# Patient Record
Sex: MALE | Race: AMERICAN INDIAN OR ALASKA NATIVE
[De-identification: names, ages, dates, MRNs, and addresses within clinical notes are randomized per-mention and may not be internally consistent; named-entity substitution may affect disease eponyms.]

---

## 2019-04-08 ENCOUNTER — HOSPITAL ENCOUNTER (OUTPATIENT)
Dept: HOSPITAL 42 - ED | Age: 44
Setting detail: OBSERVATION
LOS: 2 days | Discharge: HOME | End: 2019-04-10
Attending: INTERNAL MEDICINE | Admitting: FAMILY MEDICINE
Payer: COMMERCIAL

## 2019-04-08 VITALS — BODY MASS INDEX: 26.6 KG/M2

## 2019-04-08 DIAGNOSIS — K76.9: ICD-10-CM

## 2019-04-08 DIAGNOSIS — Z87.891: ICD-10-CM

## 2019-04-08 DIAGNOSIS — Z90.49: ICD-10-CM

## 2019-04-08 DIAGNOSIS — F11.23: ICD-10-CM

## 2019-04-08 DIAGNOSIS — D72.829: ICD-10-CM

## 2019-04-08 DIAGNOSIS — K52.9: Primary | ICD-10-CM

## 2019-04-08 LAB
ALBUMIN SERPL-MCNC: 4.6 G/DL (ref 3–4.8)
ALBUMIN/GLOB SERPL: 1.2 {RATIO} (ref 1.1–1.8)
ALT SERPL-CCNC: 9 U/L (ref 7–56)
AMORPH SED URNS QL MICRO: (no result) /HPF
APPEARANCE UR: CLEAR
APTT BLD: 34.9 SECONDS (ref 26.9–38.3)
AST SERPL-CCNC: 44 U/L (ref 17–59)
BASOPHILS # BLD AUTO: 0.02 K/MM3 (ref 0–2)
BASOPHILS NFR BLD: 0.2 % (ref 0–3)
BILIRUB UR-MCNC: NEGATIVE MG/DL
BUN SERPL-MCNC: 19 MG/DL (ref 7–21)
CALCIUM SERPL-MCNC: 9.6 MG/DL (ref 8.4–10.5)
COLOR UR: YELLOW
EOSINOPHIL # BLD: 0 10*3/UL (ref 0–0.7)
EOSINOPHIL NFR BLD: 0 % (ref 1.5–5)
ERYTHROCYTE [DISTWIDTH] IN BLOOD BY AUTOMATED COUNT: 13 % (ref 11.5–14.5)
GFR NON-AFRICAN AMERICAN: > 60
GLUCOSE UR STRIP-MCNC: NEGATIVE MG/DL
HGB BLD-MCNC: 15.7 G/DL (ref 14–18)
INR PPP: 1.06
LEUKOCYTE ESTERASE UR-ACNC: NEGATIVE LEU/UL
LIPASE SERPL-CCNC: 83 U/L (ref 23–300)
LYMPHOCYTES # BLD: 1 10*3/UL (ref 1.2–3.4)
LYMPHOCYTES NFR BLD AUTO: 8 % (ref 22–35)
MCH RBC QN AUTO: 30.9 PG (ref 25–35)
MCHC RBC AUTO-ENTMCNC: 35 G/DL (ref 31–37)
MCV RBC AUTO: 88.2 FL (ref 80–105)
MONOCYTES # BLD AUTO: 0.2 10*3/UL (ref 0.1–0.6)
MONOCYTES NFR BLD: 1.9 % (ref 1–6)
PH UR STRIP: 8.5 [PH] (ref 4.7–8)
PLATELET # BLD: 260 10^3/UL (ref 120–450)
PMV BLD AUTO: 9.6 FL (ref 7–11)
PROT UR STRIP-MCNC: 30 MG/DL
PROTHROMBIN TIME: 12 SECONDS (ref 9.4–12.5)
RBC # BLD AUTO: 5.08 10^6/UL (ref 3.5–6.1)
RBC # UR STRIP: NEGATIVE /UL
SP GR UR STRIP: <= 1.005 (ref 1–1.03)
TROPONIN I SERPL-MCNC: < 0.01 NG/ML
UROBILINOGEN UR STRIP-ACNC: 1 E.U./DL
WBC # BLD AUTO: 12.9 10^3/UL (ref 4.5–11)

## 2019-04-08 PROCEDURE — 81001 URINALYSIS AUTO W/SCOPE: CPT

## 2019-04-08 PROCEDURE — 80346 BENZODIAZEPINES1-12: CPT

## 2019-04-08 PROCEDURE — 80053 COMPREHEN METABOLIC PANEL: CPT

## 2019-04-08 PROCEDURE — 83992 ASSAY FOR PHENCYCLIDINE: CPT

## 2019-04-08 PROCEDURE — 80353 DRUG SCREENING COCAINE: CPT

## 2019-04-08 PROCEDURE — 80358 DRUG SCREENING METHADONE: CPT

## 2019-04-08 PROCEDURE — 85025 COMPLETE CBC W/AUTO DIFF WBC: CPT

## 2019-04-08 PROCEDURE — 93005 ELECTROCARDIOGRAM TRACING: CPT

## 2019-04-08 PROCEDURE — 80349 CANNABINOIDS NATURAL: CPT

## 2019-04-08 PROCEDURE — 96375 TX/PRO/DX INJ NEW DRUG ADDON: CPT

## 2019-04-08 PROCEDURE — 82105 ALPHA-FETOPROTEIN SERUM: CPT

## 2019-04-08 PROCEDURE — 85610 PROTHROMBIN TIME: CPT

## 2019-04-08 PROCEDURE — 80361 OPIATES 1 OR MORE: CPT

## 2019-04-08 PROCEDURE — 96376 TX/PRO/DX INJ SAME DRUG ADON: CPT

## 2019-04-08 PROCEDURE — 83690 ASSAY OF LIPASE: CPT

## 2019-04-08 PROCEDURE — 71045 X-RAY EXAM CHEST 1 VIEW: CPT

## 2019-04-08 PROCEDURE — 80345 DRUG SCREENING BARBITURATES: CPT

## 2019-04-08 PROCEDURE — 87086 URINE CULTURE/COLONY COUNT: CPT

## 2019-04-08 PROCEDURE — 80324 DRUG SCREEN AMPHETAMINES 1/2: CPT

## 2019-04-08 PROCEDURE — 85730 THROMBOPLASTIN TIME PARTIAL: CPT

## 2019-04-08 PROCEDURE — 80074 ACUTE HEPATITIS PANEL: CPT

## 2019-04-08 PROCEDURE — 96374 THER/PROPH/DIAG INJ IV PUSH: CPT

## 2019-04-08 PROCEDURE — 84484 ASSAY OF TROPONIN QUANT: CPT

## 2019-04-08 PROCEDURE — 74177 CT ABD & PELVIS W/CONTRAST: CPT

## 2019-04-08 PROCEDURE — 36415 COLL VENOUS BLD VENIPUNCTURE: CPT

## 2019-04-08 PROCEDURE — 99284 EMERGENCY DEPT VISIT MOD MDM: CPT

## 2019-04-08 NOTE — RAD
Date of service: 



04/08/2019



HISTORY:

 abd pain 



COMPARISON:

No prior. 



TECHNIQUE:

1 view obtained.



FINDINGS:



LUNGS:

No active pulmonary disease.



PLEURA:

No significant pleural effusion identified, no pneumothorax apparent.



CARDIOVASCULAR:

No aortic atherosclerotic calcification present.



Normal cardiac size. No pulmonary vascular congestion. 



OSSEOUS STRUCTURES:

No significant abnormalities.



VISUALIZED UPPER ABDOMEN:

Normal.



OTHER FINDINGS:

None.



IMPRESSION:

No active disease.

## 2019-04-08 NOTE — CT
Date of service: 



04/08/2019



PROCEDURE:  CT Abdomen and Pelvis with contrast



HISTORY:

s/p appendectomy w/ abdominal pain



COMPARISON:

None.



TECHNIQUE:

Contrast dose: 150 mL Omnipaque 350



Radiation dose:



Total exam DLP = 464.14 mGy-cm.



This CT exam was performed using one or more of the following dose 

reduction techniques: Automated exposure control, adjustment of the 

mA and/or kV according to patient size, and/or use of iterative 

reconstruction technique.



FINDINGS:



LOWER THORAX:

Unremarkable. 



LIVER:

Normal size, contour and attenuation.  1.3 cm rounded low-attenuation 

mass in the right lobe of the liver.  No other mass identified.  No 

biliary dilatation. 



GALLBLADDER AND BILE DUCTS:

Unremarkable. 



PANCREAS:

Unremarkable. No gross lesion or ductal dilatation.



SPLEEN:

Unremarkable. 



ADRENALS:

Unremarkable. No mass. 



KIDNEYS AND URETERS:

Unremarkable. No hydronephrosis. No solid mass. 



VASCULATURE:

Unremarkable. No aortic aneurysm. No aortic atherosclerotic 

calcification or mural plaque present.



BOWEL:

Evaluation of the bowel is somewhat limited due to patient motion 

artifact particularly obscuring the lower abdomen/upper pelvis.  



APPENDIX:

Not identified.  Status post appendectomy by history.  No evidence of 

pericecal abscess. 



PERITONEUM:

Unremarkable. No free fluid. No free air. 



LYMPH NODES:

Unremarkable. No enlarged lymph nodes. 



BLADDER:

Unremarkable. 



REPRODUCTIVE:

Normal prostate 



BONES:

No acute fracture. 



OTHER FINDINGS:

None.



IMPRESSION:

Incidental 1.3 cm rounded low-attenuation mass in the right lobe of 

the liver.  The remainder of the examination is unremarkable.  Status 

post appendectomy.  Examination limited due to patient motion 

artifact.

## 2019-04-08 NOTE — ED PDOC
Arrival/HPI





- General


Chief Complaint: Abdominal Pain


Time Seen by Provider: 04/08/19 11:04


Historian: Patient





- History of Present Illness


Narrative History of Present Illness (Text): 





04/08/19 12:01


43 year old M with pmh of appendectomy (3 month ago) presents with chief 

complaint of abdominal pain w/ nausea, vomiting, diarrhea, fevers and chills for

the past 3 days. He reports being unable to tolerate PO recently due to vomiting

and diarrhea. He denies any recent travel or consumption of usual food. He 

attempted to self-medicate by taking Pepto-Bismol with no relief in symptoms. He

also reports associated intermittent chest pain for the last 2 weeks. Patient 

denies any headache, dizziness, shortness of breath, dyspnea on exertion, cough,

 back pain, neck pain, or any other complaint.





Time/Duration: < week


Symptom Onset: Sudden


Symptom Course: Unchanged


Activities at Onset: Rest


Context: Home





Past Medical History





- Provider Review


Nursing Documentation Reviewed: Yes





- Travel History


Have you recently traveled outside [place] w/in the past 3 mons?: No





- Infectious Disease


Hx of Infectious Diseases: None





- Psychiatric


Hx Substance Use: No





- Surgical History


Hx Appendectomy: Yes (3 months ago)





- Anesthesia


Hx Anesthesia: Yes


Hx Anesthesia Reactions: No


Hx Malignant Hyperthermia: No





Family/Social History





- Physician Review


Nursing Documentation Reviewed: Yes


Family/Social History: Unknown Family HX


Smoking Status: Current Some Days Smoker


Hx Alcohol Use: Yes


Frequency of alcohol use: Socially


Hx Substance Use: No





Allergies/Home Meds


Allergies/Adverse Reactions: 


Allergies





No Known Allergies Allergy (Verified 04/08/19 11:36)


   











Review of Systems





- Physician Review


All systems were reviewed & negative as marked: Yes





- Review of Systems


Constitutional: Fevers, Other (chills)


Eyes: Normal


ENT: Normal.  absent: Sore Throat, Rhinorrhea


Respiratory: Normal.  absent: SOB, Wheezing


Cardiovascular: Chest Pain.  absent: Palpitations


Gastrointestinal: Abdominal Pain, Diarrhea, Nausea, Vomiting


Genitourinary Male: Normal


Musculoskeletal: Normal


Skin: Normal


Neurological: Normal.  absent: Headache, Dizziness


Endocrine: Normal


Hemo/Lymphatic: Normal


Psychiatric: Normal





Physical Exam


Vital Signs Reviewed: Yes





Vital Signs











  Temp Pulse Resp BP Pulse Ox


 


 04/08/19 11:02  99.1 F  86  18  109/66  100











Temperature: Afebrile


Blood Pressure: Normal


Pulse: Regular


Respiratory Rate: Normal


Appearance: Positive for: Well-Appearing, Non-Toxic, Comfortable


Pain Distress: Moderate


Mental Status: Positive for: Alert and Oriented X 3





- Systems Exam


Head: Present: Atraumatic, Normocephalic


Pupils: Present: PERRL


Extroacular Muscles: Present: EOMI


Conjunctiva: Present: Normal


Mouth: Present: Moist Mucous Membranes


Neck: Present: Normal Range of Motion


Respiratory/Chest: Present: Clear to Auscultation, Good Air Exchange.  No: 

Respiratory Distress, Accessory Muscle Use


Cardiovascular: Present: Regular Rate and Rhythm, Normal S1, S2.  No: Murmurs


Abdomen: Present: Tenderness (diffuse).  No: Distention, Peritoneal Signs


Back: Present: Normal Inspection


Upper Extremity: Present: Normal Inspection.  No: Cyanosis, Edema


Lower Extremity: Present: Normal Inspection.  No: Edema


Neurological: Present: Speech Normal


Skin: Present: Warm, Dry, Normal Color.  No: Rashes


Psychiatric: Present: Alert, Oriented x 3, Normal Insight, Normal Concentration





Medical Decision Making


ED Course and Treatment: 





04/08/19 12:09


Impression:


43 year old M presents with chief complaint of abdominal pain w/ nausea, 

vomiting, diarrhea, fevers and chills x3 days .  Patient also complains of chest

pain x 2wks. 





Plan:


-- Labs


-- Chest X-ray


--CT a/p


-- Zofran


-- UA


-- Reassess and disposition





Prior Visits:


Notes and results from previous visits were reviewed.





Progress Notes:


04/08/19 16:02


Patient reevaluated and reports minimal change from initial presentation. Labs 

reviewed with slight a a CT a/p reveals no acute intraabdominal pathology. 

Shared decision making with patient to stay in the hospital for observation. 

Discussed case with Dr. Lr(hospitalist) who accepts case onto her service. 








- Lab Interpretations


Lab Results: 














                                 04/08/19 11:58 





                                 04/08/19 11:58 





                                   Lab Results





04/08/19 11:58: Sodium 141, Potassium 4.0, Chloride 104, Carbon Dioxide 25, 

Anion Gap 16, BUN 19, Creatinine 0.8, Est GFR (African Amer) > 60, Est GFR (Non-

Af Amer) > 60, Random Glucose 112 H, Calcium 9.6, Total Bilirubin 0.6, AST 44, 

ALT 9, Alkaline Phosphatase 73, Troponin I < 0.01, Total Protein 8.3, Albumin 

4.6, Globulin 3.8, Albumin/Globulin Ratio 1.2, Lipase 83


04/08/19 11:58: PT 12.0, INR 1.06, APTT 34.9


04/08/19 11:58: WBC 12.9 H, RBC 5.08, Hgb 15.7, Hct 44.8, MCV 88.2, MCH 30.9, 

MCHC 35.0, RDW 13.0, Plt Count 260, MPV 9.6, Neut % (Auto) 89.9 H, Lymph % 

(Auto) 8.0 L, Mono % (Auto) 1.9, Eos % (Auto) 0.0 L, Baso % (Auto) 0.2, Lymph # 

(Auto) 1.0 L, Mono # (Auto) 0.2, Eos # (Auto) 0.0, Baso # (Auto) 0.02, Absolute 

Neuts (auto) 11.58 H








I have reviewed the lab results: Yes





- RAD Interpretation


Narrative RAD Interpretations (Text): 





04/08/19 14:26


CT of abdomen/pelvis reviewed by radiologist, shows:


FINDINGS:


LOWER THORAX:


Unremarkable. 


LIVER:


Normal size, contour and attenuation.  1.3 cm rounded low-attenuation mass in 

the right lobe of the liver.  No other mass identified.  No biliary dilatation. 


GALLBLADDER AND BILE DUCTS:


Unremarkable. 


PANCREAS:


Unremarkable. No gross lesion or ductal dilatation.


SPLEEN:


Unremarkable. 


ADRENALS:


Unremarkable. No mass. 


KIDNEYS AND URETERS:


Unremarkable. No hydronephrosis. No solid mass. 


VASCULATURE:


Unremarkable. No aortic aneurysm. No aortic atherosclerotic calcification or 

mural plaque present.


BOWEL:


Evaluation of the bowel is somewhat limited due to patient motion artifact 

particularly obscuring the lower abdomen/upper pelvis.  


APPENDIX:


Not identified.  Status post appendectomy by history.  No evidence of pericecal 

abscess. 


PERITONEUM:


Unremarkable. No free fluid. No free air. 


LYMPH NODES:


Unremarkable. No enlarged lymph nodes. 


BLADDER:


Unremarkable. 


REPRODUCTIVE:


Normal prostate 


BONES:


No acute fracture. 


OTHER FINDINGS:


None.


IMPRESSION:


Incidental 1.3 cm rounded low-attenuation mass in the right lobe of the liver.  

The remainder of the examination is unremarkable.  Status post appendectomy.  

Examination limited due to patient motion artifact.


==============================================

================================================================








04/08/19 14:28


Chest X-ray reviewed by radiologist, shows:


FINDINGS:


LUNGS:


No active pulmonary disease.


PLEURA:


No significant pleural effusion identified, no pneumothorax apparent.


CARDIOVASCULAR:


No aortic atherosclerotic calcification present.


Normal cardiac size. No pulmonary vascular congestion. 


OSSEOUS STRUCTURES:


No significant abnormalities.


VISUALIZED UPPER ABDOMEN:


Normal.


OTHER FINDINGS:


None.


IMPRESSION:


No active disease.





Radiology Orders: 











04/08/19 11:43


CHEST PORTABLE [RAD] Stat 











: Radiologist





- Medication Orders


Current Medication Orders: 











Sodium Chloride (Sodium Chloride 0.9%)  1,000 mls @ 999 mls/hr IV .Q1H1M STA


   Stop: 04/08/19 12:43





Discontinued Medications





Ondansetron HCl (Zofran Inj)  4 mg IVP STAT STA


   Stop: 04/08/19 11:44











- Scribe Statement


The provider has reviewed the documentation as recorded by the Scribe Dalyngs Duvelsaint





All medical record entries made by the Florinibamanda were at my direction and 

personally dictated by me. I have reviewed the chart and agree that the record 

accurately reflects my personal performance of the history, physical exam, 

medical decision making, and the department course for this patient. I have also

 personally directed, reviewed, and agree with the discharge instructions and 

disposition.








Disposition/Present on Arrival





- Present on Arrival


Any Indicators Present on Arrival: No


History of DVT/PE: No


History of Uncontrolled Diabetes: No


Urinary Catheter: No


History of Decub. Ulcer: No


History Surgical Site Infection Following: None





- Disposition


Have Diagnosis and Disposition been Completed?: Yes


Diagnosis: 


 Gastroenteritis





Disposition: HOSPITALIZED


Disposition Time: 15:00


Patient Plan: Admission


Condition: GOOD

## 2019-04-08 NOTE — CP.PCM.HP
<Leoncio Anne - Last Filed: 04/08/19 20:30>





History of Present Illness





- History of Present Illness


History of Present Illness: 


Internal Medicine History and Physical (Dr. Thornton's Service)





CC: Nausea/Vomiting/Diarrhea





HPI: Mr. Larios is a 43 year old male with a past medical history significant 

for recent appendectomy presents for N/V, diarrhea and epigastric abdominal pain

for the past three days. Patient reports that for the past 10 months he has been

having episodic sharp and crampy abdominal pain that lasts 3-4 days. This is 

associated with 2-3 episodes of bilious vomiting daily as well as watery 

diarrhea 2-3 times per day. He denies any association with PO intake of any 

kind, activity, or with urination. He denies any relieving factors including 

Pepcid and Pepto Bismol. Patient has been seen multiple times at [facility] for this 

complaint and had an appendectomy approximately 1-2 months ago. He denies any 

relief after the operation and has had the same abdominal pain, N/V and diarrhea

at the same frequencies since that time. Patient denies ever having been seen by

an outpatient Gastroenterologist or ever having had an EGD/CSPY. Patient denies 

any other complaints at this time. 12 point ROS reviewed and is unremarkable 

outside of what has been mentioned above.





PMH: Denies


PSH: Appendectomy, Ankle Repair and Jaw Repair


Family History: Mother-Passed away from complications related to HIV


Social History: Current one pack per day smoker, drinks 2-3 mixed vodka drinks 

2-3/week, and intermittently uses illicit drugs (used ecstasy two weeks ago)


Allergies: NKDA


Home Medications: None





PMD: None





Present on Admission





- Present on Admission


Any Indicators Present on Admission: No





Review of Systems





- Review of Systems


Review of Systems: 


As stated in HPI, otherwise negative





Past Patient History





- Infectious Disease


Hx of Infectious Diseases: None





- Past Social History


Smoking Status: Current Some Days Smoker





- PSYCHIATRIC


Hx Substance Use: No





- SURGICAL HISTORY


Hx Appendectomy: Yes (3 months ago)





- ANESTHESIA


Hx Anesthesia: Yes


Hx Anesthesia Reactions: No


Hx Malignant Hyperthermia: No





Meds


Allergies/Adverse Reactions: 


                                    Allergies











Allergy/AdvReac Type Severity Reaction Status Date / Time


 


No Known Allergies Allergy   Verified 04/08/19 11:36














Physical Exam





- Constitutional


Appears: Non-toxic, No Acute Distress





- Head Exam


Head Exam: ATRAUMATIC, NORMOCEPHALIC





- Eye Exam


Eye Exam: EOMI, Normal appearance, PERRL





- ENT Exam


ENT Exam: Mucous Membranes Moist





- Neck Exam


Neck exam: Positive for: Full Rom





- Respiratory Exam


Respiratory Exam: Clear to Auscultation Bilateral, NORMAL BREATHING PATTERN.  

absent: Accessory Muscle Use, Rales, Rhonchi, Wheezes, Respiratory Distress





- Cardiovascular Exam


Cardiovascular Exam: REGULAR RHYTHM, RRR, +S1, +S2





- GI/Abdominal Exam


GI & Abdominal Exam: Normal Bowel Sounds, Soft, Tenderness (tenderness to deep 

palpation diffusely).  absent: Bruit, Diminished Bowel Sounds, Distended, Firm, 

Guarding, Hernia, Hyperactive Bowel Sounds, Hypoactive Bowel Sounds, Mass, 

Organomegaly, Pulsatile Mass, Rebound, Rigid





- Extremities Exam


Extremities exam: Positive for: full ROM, normal capillary refill, normal 

inspection, pedal pulses present.  Negative for: calf tenderness, joint 

swelling, pedal edema, tenderness





- Neurological Exam


Neurological exam: Alert, Oriented x3





- Psychiatric Exam


Psychiatric exam: Normal Affect, Normal Mood





- Skin


Skin Exam: Dry, Intact, Normal Color, Warm





Results





- Vital Signs


Recent Vital Signs: 





                                Last Vital Signs











Temp  98.4 F   04/08/19 15:51


 


Pulse  70   04/08/19 15:51


 


Resp  18   04/08/19 15:51


 


BP  154/57 H  04/08/19 15:51


 


Pulse Ox  100   04/08/19 15:51














- Labs


Result Diagrams: 


                                 04/08/19 11:58





                                 04/08/19 11:58


Labs: 





                         Laboratory Results - last 24 hr











  04/08/19 04/08/19 04/08/19





  11:58 11:58 11:58


 


WBC  12.9 H  


 


RBC  5.08  


 


Hgb  15.7  


 


Hct  44.8  


 


MCV  88.2  


 


MCH  30.9  


 


MCHC  35.0  


 


RDW  13.0  


 


Plt Count  260  


 


MPV  9.6  


 


Neut % (Auto)  89.9 H  


 


Lymph % (Auto)  8.0 L  


 


Mono % (Auto)  1.9  


 


Eos % (Auto)  0.0 L  


 


Baso % (Auto)  0.2  


 


Lymph # (Auto)  1.0 L  


 


Mono # (Auto)  0.2  


 


Eos # (Auto)  0.0  


 


Baso # (Auto)  0.02  


 


Absolute Neuts (auto)  11.58 H  


 


PT   12.0 


 


INR   1.06 


 


APTT   34.9 


 


Sodium    141


 


Potassium    4.0


 


Chloride    104


 


Carbon Dioxide    25


 


Anion Gap    16


 


BUN    19


 


Creatinine    0.8


 


Est GFR ( Amer)    > 60


 


Est GFR (Non-Af Amer)    > 60


 


Random Glucose    112 H


 


Calcium    9.6


 


Total Bilirubin    0.6


 


AST    44


 


ALT    9


 


Alkaline Phosphatase    73


 


Troponin I    < 0.01


 


Total Protein    8.3


 


Albumin    4.6


 


Globulin    3.8


 


Albumin/Globulin Ratio    1.2


 


Lipase    83














Assessment & Plan





- Assessment and Plan (Free Text)


Assessment: 


43 year old male with a past medical history significant for recent appendectomy

presents for N/V, diarrhea and epigastric abdominal pain for the past three 

days.


Plan: 





1. Abdominal Pain


-CT Abdomen/Pelvis showed no acute processes


-Afebrile, normotensive, non-tachycardic and with noted mild leukocytosis


-Normal Saline at 100mls/hr


-Zofran 4mg Q4 PRN for N/V


-Protonix 40mg IVP


-Vitamin A&D ointment PRN for dry mouth


-NPO except medications





2. Leukocytosis


-Mild; Noted on CBC in ED


-Chest X-Ray showed no active disease


-UA showing no signs of UTI


-Continue to monitor with daily CBC's





3. Liver Cyst


-Noted incidentally on CT abdomen/pelvis


-Will obtain records from [facility] for comparison


-Will recommend outpatient followup for serial imaging





GI Prophylaxis: Protonix


DVT Prophylaxis: SCD's





Patient seen and case discussed with attending, Dr. Thornton.


Leoncio Anne PGY2





- Date & Time


Date: 04/08/19


Time: 17:16





<Clara Thornton - Last Filed: 04/09/19 08:37>





Results





- Vital Signs


Recent Vital Signs: 





                                Last Vital Signs











Temp  98.4 F   04/09/19 08:18


 


Pulse  65   04/09/19 08:18


 


Resp  18   04/09/19 08:18


 


BP  166/86 H  04/09/19 08:18


 


Pulse Ox  93 L  04/09/19 08:18














- Labs


Result Diagrams: 


                                 04/09/19 05:30





                                 04/09/19 05:30


Labs: 





                         Laboratory Results - last 24 hr











  04/08/19 04/08/19 04/08/19





  11:58 11:58 11:58


 


WBC  12.9 H  


 


RBC  5.08  


 


Hgb  15.7  


 


Hct  44.8  


 


MCV  88.2  


 


MCH  30.9  


 


MCHC  35.0  


 


RDW  13.0  


 


Plt Count  260  


 


MPV  9.6  


 


Neut % (Auto)  89.9 H  


 


Lymph % (Auto)  8.0 L  


 


Mono % (Auto)  1.9  


 


Eos % (Auto)  0.0 L  


 


Baso % (Auto)  0.2  


 


Lymph # (Auto)  1.0 L  


 


Mono # (Auto)  0.2  


 


Eos # (Auto)  0.0  


 


Baso # (Auto)  0.02  


 


Absolute Neuts (auto)  11.58 H  


 


PT   12.0 


 


INR   1.06 


 


APTT   34.9 


 


Sodium    141


 


Potassium    4.0


 


Chloride    104


 


Carbon Dioxide    25


 


Anion Gap    16


 


BUN    19


 


Creatinine    0.8


 


Est GFR ( Amer)    > 60


 


Est GFR (Non-Af Amer)    > 60


 


Random Glucose    112 H


 


Calcium    9.6


 


Total Bilirubin    0.6


 


AST    44


 


ALT    9


 


Alkaline Phosphatase    73


 


Troponin I    < 0.01


 


Total Protein    8.3


 


Albumin    4.6


 


Globulin    3.8


 


Albumin/Globulin Ratio    1.2


 


Lipase    83


 


Urine Color   


 


Urine Appearance   


 


Urine pH   


 


Ur Specific Gravity   


 


Urine Protein   


 


Urine Glucose (UA)   


 


Urine Ketones   


 


Urine Blood   


 


Urine Nitrate   


 


Urine Bilirubin   


 


Urine Urobilinogen   


 


Ur Leukocyte Esterase   


 


Urine RBC   


 


Urine WBC   


 


Ur Epithelial Cells   


 


Amorphous Sediment   


 


Urine Opiates Screen   


 


Urine Methadone Screen   


 


Ur Barbiturates Screen   


 


Ur Phencyclidine Scrn   


 


Ur Amphetamines Screen   


 


U Benzodiazepines Scrn   


 


U Oth Cocaine Metabols   


 


U Cannabinoids Screen   














  04/08/19 04/08/19 04/09/19





  17:30 17:30 05:30


 


WBC    14.4 H


 


RBC    4.51


 


Hgb    13.8 L


 


Hct    40.3 L


 


MCV    89.4


 


MCH    30.6


 


MCHC    34.2


 


RDW    13.1


 


Plt Count    254


 


MPV    10.1


 


Neut % (Auto)    89.8 H


 


Lymph % (Auto)    7.7 L


 


Mono % (Auto)    2.5


 


Eos % (Auto)    0.0 L


 


Baso % (Auto)    0.0


 


Lymph # (Auto)    1.1 L


 


Mono # (Auto)    0.4


 


Eos # (Auto)    0.0


 


Baso # (Auto)    0.00


 


Absolute Neuts (auto)    12.91 H


 


PT   


 


INR   


 


APTT   


 


Sodium   


 


Potassium   


 


Chloride   


 


Carbon Dioxide   


 


Anion Gap   


 


BUN   


 


Creatinine   


 


Est GFR ( Amer)   


 


Est GFR (Non-Af Amer)   


 


Random Glucose   


 


Calcium   


 


Total Bilirubin   


 


AST   


 


ALT   


 


Alkaline Phosphatase   


 


Troponin I   


 


Total Protein   


 


Albumin   


 


Globulin   


 


Albumin/Globulin Ratio   


 


Lipase   


 


Urine Color   Yellow 


 


Urine Appearance   Clear 


 


Urine pH   8.5 


 


Ur Specific Gravity   <= 1.005 


 


Urine Protein   30 H 


 


Urine Glucose (UA)   Negative 


 


Urine Ketones   40 H 


 


Urine Blood   Negative 


 


Urine Nitrate   Negative 


 


Urine Bilirubin   Negative 


 


Urine Urobilinogen   1.0 H 


 


Ur Leukocyte Esterase   Negative 


 


Urine RBC   TEST NOT PERFORMED 


 


Urine WBC   5 - 10 H 


 


Ur Epithelial Cells   6 - 8 H 


 


Amorphous Sediment   Trace 


 


Urine Opiates Screen  Positive H  


 


Urine Methadone Screen  Negative  


 


Ur Barbiturates Screen  Negative  


 


Ur Phencyclidine Scrn  Negative  


 


Ur Amphetamines Screen  Negative  


 


U Benzodiazepines Scrn  Negative  


 


U Oth Cocaine Metabols  Negative  


 


U Cannabinoids Screen  Negative  














  04/09/19





  05:30


 


WBC 


 


RBC 


 


Hgb 


 


Hct 


 


MCV 


 


MCH 


 


MCHC 


 


RDW 


 


Plt Count 


 


MPV 


 


Neut % (Auto) 


 


Lymph % (Auto) 


 


Mono % (Auto) 


 


Eos % (Auto) 


 


Baso % (Auto) 


 


Lymph # (Auto) 


 


Mono # (Auto) 


 


Eos # (Auto) 


 


Baso # (Auto) 


 


Absolute Neuts (auto) 


 


PT 


 


INR 


 


APTT 


 


Sodium  144


 


Potassium  3.9


 


Chloride  109 H


 


Carbon Dioxide  24


 


Anion Gap  15


 


BUN  26 H


 


Creatinine  1.0


 


Est GFR ( Amer)  > 60


 


Est GFR (Non-Af Amer)  > 60


 


Random Glucose  95


 


Calcium  8.9


 


Total Bilirubin  0.5


 


AST  29


 


ALT  10


 


Alkaline Phosphatase  60


 


Troponin I  < 0.01


 


Total Protein  6.9


 


Albumin  3.8


 


Globulin  3.1


 


Albumin/Globulin Ratio  1.2


 


Lipase 


 


Urine Color 


 


Urine Appearance 


 


Urine pH 


 


Ur Specific Gravity 


 


Urine Protein 


 


Urine Glucose (UA) 


 


Urine Ketones 


 


Urine Blood 


 


Urine Nitrate 


 


Urine Bilirubin 


 


Urine Urobilinogen 


 


Ur Leukocyte Esterase 


 


Urine RBC 


 


Urine WBC 


 


Ur Epithelial Cells 


 


Amorphous Sediment 


 


Urine Opiates Screen 


 


Urine Methadone Screen 


 


Ur Barbiturates Screen 


 


Ur Phencyclidine Scrn 


 


Ur Amphetamines Screen 


 


U Benzodiazepines Scrn 


 


U Oth Cocaine Metabols 


 


U Cannabinoids Screen 














Attending/Attestation





- Attestation


I have personally seen and examined this patient.: Yes


I have fully participated in the care of the patient.: Yes


I have reviewed all pertinent clinical information: Yes


Notes (Text): 





04/08/19


43 year old male who presents with complaint of intractable nausea and vomiting 

with episodes of diarrhea and epigastric pain.  He reports symptoms have been on

and off ongoing for the past several months.  History of appendectomy several 

months ago.  CT abd/pelvis showed incidental 1.3 cm right lobe liver lesion, 

otherwise unremarkable.  Mild leukocytosis on labs, likely reactive.  CXR/UA is 

negative.  UTox is ordered.  Continue with NPO, IVF, protonix and anti-emetics 

as needed.  Consider GI evaluation if symptoms are persistent.  Will need 

outpatient follow up for incidental liver lesion seen on CT scan.  Can request 

prior records from [facility] for comparison if available.





Clara Thornton MD


Hospitalist.

## 2019-04-09 VITALS
OXYGEN SATURATION: 98 % | SYSTOLIC BLOOD PRESSURE: 142 MMHG | TEMPERATURE: 99.6 F | HEART RATE: 63 BPM | DIASTOLIC BLOOD PRESSURE: 82 MMHG

## 2019-04-09 VITALS — RESPIRATION RATE: 18 BRPM

## 2019-04-09 LAB
ALBUMIN SERPL-MCNC: 3.8 G/DL (ref 3–4.8)
ALBUMIN/GLOB SERPL: 1.2 {RATIO} (ref 1.1–1.8)
ALT SERPL-CCNC: 10 U/L (ref 7–56)
AST SERPL-CCNC: 29 U/L (ref 17–59)
BASOPHILS # BLD AUTO: 0 K/MM3 (ref 0–2)
BASOPHILS NFR BLD: 0 % (ref 0–3)
BUN SERPL-MCNC: 26 MG/DL (ref 7–21)
CALCIUM SERPL-MCNC: 8.9 MG/DL (ref 8.4–10.5)
EOSINOPHIL # BLD: 0 10*3/UL (ref 0–0.7)
EOSINOPHIL NFR BLD: 0 % (ref 1.5–5)
ERYTHROCYTE [DISTWIDTH] IN BLOOD BY AUTOMATED COUNT: 13.1 % (ref 11.5–14.5)
GFR NON-AFRICAN AMERICAN: > 60
HEPATITIS A IGM: NEGATIVE
HEPATITIS B CORE AB: NEGATIVE
HEPATITIS C ANTIBODY: NEGATIVE
HGB BLD-MCNC: 13.8 G/DL (ref 14–18)
LYMPHOCYTES # BLD: 1.1 10*3/UL (ref 1.2–3.4)
LYMPHOCYTES NFR BLD AUTO: 7.7 % (ref 22–35)
MCH RBC QN AUTO: 30.6 PG (ref 25–35)
MCHC RBC AUTO-ENTMCNC: 34.2 G/DL (ref 31–37)
MCV RBC AUTO: 89.4 FL (ref 80–105)
MONOCYTES # BLD AUTO: 0.4 10*3/UL (ref 0.1–0.6)
MONOCYTES NFR BLD: 2.5 % (ref 1–6)
PLATELET # BLD: 254 10^3/UL (ref 120–450)
PMV BLD AUTO: 10.1 FL (ref 7–11)
RBC # BLD AUTO: 4.51 10^6/UL (ref 3.5–6.1)
TROPONIN I SERPL-MCNC: < 0.01 NG/ML
WBC # BLD AUTO: 14.4 10^3/UL (ref 4.5–11)

## 2019-04-09 RX ADMIN — POLYETHYLENE GLYCOL 3350 SCH: 17 POWDER, FOR SOLUTION ORAL at 18:01

## 2019-04-09 NOTE — CP.PCM.CON
<Helen Vargas - Last Filed: 04/09/19 13:34>





History of Present Illness





- History of Present Illness


History of Present Illness: 


Gastroenterology Fellow/PGY6 Consult Note





43 year old male with PMH of Polysubstance abuse (heroin, ecstasy, tobacco, 

alcohol) presenting with vomiting and diarrhea. Patient notes onset of symptoms 

for the last few days with over ten episodes of watery diarrhea and bilious 

vomiting leading to presentation. Patient notes improving of vomitning after 

medical treatment received in ER. Admits to persistent nausea and watery 

diarrhea. Associated chills and dry cough. Denies abdominal pain, heartburn, 

acid reflux, hematemesis, melena, hematochezia, or unintentional weight loss, 

recent sick contacts, or new medications. Last used heroin and ecstasy five days

ago. Notes he usually uses illicit drugs every two days at baseline. Notes that 

he is a  and delivers packages to medical offices. [place] 

[place] [facility] prior EGD or colonoscopy. 





Family History- Mother- HIV, uncle-MI; denies liver disease, stomach cancer, 

colon cancer


Social History- 1/2-1ppd, at least 2 pints of beer/liquor daily 3-4 times a 

week, ecstasy and heroin use every two days


Surgical History- appendectomy, jaw repair, ankle repair








Review of Systems





- Review of Systems


Review of Systems: 


12-point review of systems negative except for as above








Past Patient History





- Infectious Disease


Hx of Infectious Diseases: None





- Past Social History


Smoking Status: Current Some Days Smoker





- CARDIAC


Hx Cardiac Disorders: No





- PULMONARY


Hx Respiratory Disorders: No





- NEUROLOGICAL


Hx Neurological Disorder: No





- HEENT


Hx HEENT Problems: No





- RENAL


Hx Chronic Kidney Disease: No





- ENDOCRINE/METABOLIC


Hx Endocrine Disorders: No





- HEMATOLOGICAL/ONCOLOGICAL


Hx Blood Disorders: No





- INTEGUMENTARY


Hx Dermatological Problems: No





- MUSCULOSKELETAL/RHEUMATOLOGICAL


Hx Musculoskeletal Disorders: No


Hx Falls: No





- GASTROINTESTINAL


Hx Gastrointestinal Disorders: No





- GENITOURINARY/GYNECOLOGICAL


Hx Genitourinary Disorders: No





- PSYCHIATRIC


Hx Substance Use: No





- SURGICAL HISTORY


Hx Appendectomy: Yes (3 months ago)





- ANESTHESIA


Hx Anesthesia: Yes


Hx Anesthesia Reactions: No


Hx Malignant Hyperthermia: No





Meds


Allergies/Adverse Reactions: 


                                    Allergies











Allergy/AdvReac Type Severity Reaction Status Date / Time


 


No Known Allergies Allergy   Verified 04/08/19 11:36














- Medications


Medications: 


                               Current Medications





Sodium Chloride (Sodium Chloride 0.9%)  1,000 mls @ 100 mls/hr IV .Q10H KATIE


   Last Admin: 04/09/19 05:17 Dose:  100 mls/hr


Ondansetron HCl (Zofran Inj)  4 mg IVP Q4H PRN


   PRN Reason: Nausea/Vomiting


   Last Admin: 04/09/19 01:20 Dose:  4 mg


Pantoprazole Sodium (Protonix Inj)  40 mg IVP DAILY [facility]


Vitamin A (Vitamin A & D Oint Ud Foilpak)  1 ea TOP Q2 PRN


   PRN Reason: Dry mouth











Physical Exam





- Constitutional


Appears: Non-toxic, No Acute Distress





- Head Exam


Head Exam: ATRAUMATIC, NORMOCEPHALIC





- Eye Exam


Eye Exam: EOMI, PERRL.  absent: Scleral icterus


Pupil Exam: PERRL.  absent: Miosis, Mydriatic





- ENT Exam


ENT Exam: Mucous Membranes Moist, Normal Oropharynx





- Neck Exam


Neck exam: Positive for: Full Rom, Normal Inspection





- Respiratory Exam


Respiratory Exam: Clear to Auscultation Bilateral.  absent: Rales, Rhonchi, 

Wheezes





- Cardiovascular Exam


Cardiovascular Exam: RRR, +S1, +S2.  absent: Gallop, Rubs





- GI/Abdominal Exam


GI & Abdominal Exam: Normal Bowel Sounds, Soft.  absent: Distended, Firm, 

Guarding, Organomegaly, Rebound, Rigid, Tenderness





- Extremities Exam


Extremities exam: Positive for: normal inspection.  Negative for: pedal edema





- Neurological Exam


Neurological exam: Alert





- Psychiatric Exam


Psychiatric exam: Normal Affect, Normal Mood





- Skin


Skin Exam: Dry, Intact, Normal Color, Warm





Results





- Vital Signs


Recent Vital Signs: 


                                Last Vital Signs











Temp  98.4 F   04/09/19 08:18


 


Pulse  65   04/09/19 08:18


 


Resp  18   04/09/19 08:18


 


BP  166/86 H  04/09/19 08:18


 


Pulse Ox  93 L  04/09/19 08:18














- Labs


Result Diagrams: 


                                 04/09/19 05:30





                                 04/09/19 05:30


Labs: 


                         Laboratory Results - last 24 hr











  04/08/19 04/08/19 04/08/19





  11:58 11:58 11:58


 


WBC  12.9 H  


 


RBC  5.08  


 


Hgb  15.7  


 


Hct  44.8  


 


MCV  88.2  


 


MCH  30.9  


 


MCHC  35.0  


 


RDW  13.0  


 


Plt Count  260  


 


MPV  9.6  


 


Neut % (Auto)  89.9 H  


 


Lymph % (Auto)  8.0 L  


 


Mono % (Auto)  1.9  


 


Eos % (Auto)  0.0 L  


 


Baso % (Auto)  0.2  


 


Lymph # (Auto)  1.0 L  


 


Mono # (Auto)  0.2  


 


Eos # (Auto)  0.0  


 


Baso # (Auto)  0.02  


 


Absolute Neuts (auto)  11.58 H  


 


PT   12.0 


 


INR   1.06 


 


APTT   34.9 


 


Sodium    141


 


Potassium    4.0


 


Chloride    104


 


Carbon Dioxide    25


 


Anion Gap    16


 


BUN    19


 


Creatinine    0.8


 


Est GFR ( Amer)    > 60


 


Est GFR (Non-Af Amer)    > 60


 


Random Glucose    112 H


 


Calcium    9.6


 


Total Bilirubin    0.6


 


AST    44


 


ALT    9


 


Alkaline Phosphatase    73


 


Troponin I    < 0.01


 


Total Protein    8.3


 


Albumin    4.6


 


Globulin    3.8


 


Albumin/Globulin Ratio    1.2


 


Lipase    83


 


Urine Color   


 


Urine Appearance   


 


Urine pH   


 


Ur Specific Gravity   


 


Urine Protein   


 


Urine Glucose (UA)   


 


Urine Ketones   


 


Urine Blood   


 


Urine Nitrate   


 


Urine Bilirubin   


 


Urine Urobilinogen   


 


Ur Leukocyte Esterase   


 


Urine RBC   


 


Urine WBC   


 


Ur Epithelial Cells   


 


Amorphous Sediment   


 


Urine Opiates Screen   


 


Urine Methadone Screen   


 


Ur Barbiturates Screen   


 


Ur Phencyclidine Scrn   


 


Ur Amphetamines Screen   


 


U Benzodiazepines Scrn   


 


U Oth Cocaine Metabols   


 


U Cannabinoids Screen   














  04/08/19 04/08/19 04/09/19





  17:30 17:30 05:30


 


WBC    14.4 H


 


RBC    4.51


 


Hgb    13.8 L


 


Hct    40.3 L


 


MCV    89.4


 


MCH    30.6


 


MCHC    34.2


 


RDW    13.1


 


Plt Count    254


 


MPV    10.1


 


Neut % (Auto)    89.8 H


 


Lymph % (Auto)    7.7 L


 


Mono % (Auto)    2.5


 


Eos % (Auto)    0.0 L


 


Baso % (Auto)    0.0


 


Lymph # (Auto)    1.1 L


 


Mono # (Auto)    0.4


 


Eos # (Auto)    0.0


 


Baso # (Auto)    0.00


 


Absolute Neuts (auto)    12.91 H


 


PT   


 


INR   


 


APTT   


 


Sodium   


 


Potassium   


 


Chloride   


 


Carbon Dioxide   


 


Anion Gap   


 


BUN   


 


Creatinine   


 


Est GFR ( Amer)   


 


Est GFR (Non-Af Amer)   


 


Random Glucose   


 


Calcium   


 


Total Bilirubin   


 


AST   


 


ALT   


 


Alkaline Phosphatase   


 


Troponin I   


 


Total Protein   


 


Albumin   


 


Globulin   


 


Albumin/Globulin Ratio   


 


Lipase   


 


Urine Color   Yellow 


 


Urine Appearance   Clear 


 


Urine pH   8.5 


 


Ur Specific Gravity   <= 1.005 


 


Urine Protein   30 H 


 


Urine Glucose (UA)   Negative 


 


Urine Ketones   40 H 


 


Urine Blood   Negative 


 


Urine Nitrate   Negative 


 


Urine Bilirubin   Negative 


 


Urine Urobilinogen   1.0 H 


 


Ur Leukocyte Esterase   Negative 


 


Urine RBC   TEST NOT PERFORMED 


 


Urine WBC   5 - 10 H 


 


Ur Epithelial Cells   6 - 8 H 


 


Amorphous Sediment   Trace 


 


Urine Opiates Screen  Positive H  


 


Urine Methadone Screen  Negative  


 


Ur Barbiturates Screen  Negative  


 


Ur Phencyclidine Scrn  Negative  


 


Ur Amphetamines Screen  Negative  


 


U Benzodiazepines Scrn  Negative  


 


U Oth Cocaine Metabols  Negative  


 


U Cannabinoids Screen  Negative  














  04/09/19





  05:30


 


WBC 


 


RBC 


 


Hgb 


 


Hct 


 


MCV 


 


MCH 


 


MCHC 


 


RDW 


 


Plt Count 


 


MPV 


 


Neut % (Auto) 


 


Lymph % (Auto) 


 


Mono % (Auto) 


 


Eos % (Auto) 


 


Baso % (Auto) 


 


Lymph # (Auto) 


 


Mono # (Auto) 


 


Eos # (Auto) 


 


Baso # (Auto) 


 


Absolute Neuts (auto) 


 


PT 


 


INR 


 


APTT 


 


Sodium  144


 


Potassium  3.9


 


Chloride  109 H


 


Carbon Dioxide  24


 


Anion Gap  15


 


BUN  26 H


 


Creatinine  1.0


 


Est GFR ( Amer)  > 60


 


Est GFR (Non-Af Amer)  > 60


 


Random Glucose  95


 


Calcium  8.9


 


Total Bilirubin  0.5


 


AST  29


 


ALT  10


 


Alkaline Phosphatase  60


 


Troponin I  < 0.01


 


Total Protein  6.9


 


Albumin  3.8


 


Globulin  3.1


 


Albumin/Globulin Ratio  1.2


 


Lipase 


 


Urine Color 


 


Urine Appearance 


 


Urine pH 


 


Ur Specific Gravity 


 


Urine Protein 


 


Urine Glucose (UA) 


 


Urine Ketones 


 


Urine Blood 


 


Urine Nitrate 


 


Urine Bilirubin 


 


Urine Urobilinogen 


 


Ur Leukocyte Esterase 


 


Urine RBC 


 


Urine WBC 


 


Ur Epithelial Cells 


 


Amorphous Sediment 


 


Urine Opiates Screen 


 


Urine Methadone Screen 


 


Ur Barbiturates Screen 


 


Ur Phencyclidine Scrn 


 


Ur Amphetamines Screen 


 


U Benzodiazepines Scrn 


 


U Oth Cocaine Metabols 


 


U Cannabinoids Screen 














Assessment & Plan





- Assessment and Plan (Free Text)


Assessment: 


43 year old male with PMH of Polysubstance abuse (heroin, ecstasy, tobacco, 

alcohol) presenting with vomiting and diarrhea. [place] 

[place] [facility] No prior EGD or colonoscopy. 








Plan: 





-likely heroin/ecstasy withdrawal


-patient having formed stools on morning evaluation


-CT A/P IV contrast- constipation, no bowel or stomach pathology noted


-rule out infectious diarrhea if episode of watery stools occurs- Cdiff, stool 

culture, O&P


-supportive care- anti-emetics, PPI ACB, Pepcid QHS, IVFs


-tolerated clear liquids, advance to regular diet as tolerated


-start Miralax daily


-CT-noted hypodensity right hepatic lobe


-ordered AFP, Hepatitis panel


-will benefit from elective multiphasic CT liver protocol


-recommend rule out HIV


-will follow clinical course 





<Chris Fletcher - Last Filed: 04/10/19 00:02>





Meds





- Medications


Medications: 


                               Current Medications





Famotidine (Pepcid)  40 mg PO HS [facility]ATIE


   Last Admin: 04/09/19 22:19 Dose:  40 mg


Sodium Chloride (Sodium Chloride 0.9%)  1,000 mls @ 100 mls/hr IV .Q10H [facility]


   Last Admin: 04/09/19 05:17 Dose:  100 mls/hr


Lorazepam (Ativan)  2 mg IVP Q4H PRN; Protocol


   PRN Reason: Withdrawl


   Last Admin: 04/09/19 23:39 Dose:  2 mg


Ondansetron HCl (Zofran Inj)  4 mg IVP Q4H PRN


   PRN Reason: Nausea/Vomiting


   Last Admin: 04/09/19 23:40 Dose:  4 mg


Pantoprazole Sodium (Protonix Inj)  40 mg IVP 0700 [facility]


Polyethylene Glycol (Miralax)  17 gm PO DAILY [facility]


   Last Admin: 04/09/19 18:01 Dose:  Not Given


Vitamin A (Vitamin A & D Oint Ud Foilpak)  1 ea TOP Q2 PRN


   PRN Reason: Dry mouth











Results





- Vital Signs


Recent Vital Signs: 


                                Last Vital Signs











Temp  99.6 F   04/09/19 16:30


 


Pulse  63   04/09/19 16:30


 


Resp  18   04/09/19 16:30


 


BP  142/82   04/09/19 16:30


 


Pulse Ox  98   04/09/19 16:30














- Labs


Result Diagrams: 


                                 04/09/19 05:30





                                 04/09/19 05:30


Labs: 


                         Laboratory Results - last 24 hr











  04/09/19 04/09/19 04/09/19





  05:30 05:30 08:50


 


WBC  14.4 H  


 


RBC  4.51  


 


Hgb  13.8 L  


 


Hct  40.3 L  


 


MCV  89.4  


 


MCH  30.6  


 


MCHC  34.2  


 


RDW  13.1  


 


Plt Count  254  


 


MPV  10.1  


 


Neut % (Auto)  89.8 H  


 


Lymph % (Auto)  7.7 L  


 


Mono % (Auto)  2.5  


 


Eos % (Auto)  0.0 L  


 


Baso % (Auto)  0.0  


 


Lymph # (Auto)  1.1 L  


 


Mono # (Auto)  0.4  


 


Eos # (Auto)  0.0  


 


Baso # (Auto)  0.00  


 


Absolute Neuts (auto)  12.91 H  


 


Sodium   144 


 


Potassium   3.9 


 


Chloride   109 H 


 


Carbon Dioxide   24 


 


Anion Gap   15 


 


BUN   26 H 


 


Creatinine   1.0 


 


Est GFR ( Amer)   > 60 


 


Est GFR (Non-Af Amer)   > 60 


 


Random Glucose   95 


 


Calcium   8.9 


 


Total Bilirubin   0.5 


 


AST   29 


 


ALT   10 


 


Alkaline Phosphatase   60 


 


Troponin I   < 0.01 


 


Total Protein   6.9 


 


Albumin   3.8 


 


Globulin   3.1 


 


Albumin/Globulin Ratio   1.2 


 


Alpha Fetoprotein    1.2


 


Hepatitis A IgM Ab   


 


Hep Bs Antigen   


 


Hep B Core IgM Ab   


 


Hepatitis C Antibody   














  04/09/19 04/09/19





  08:50 13:15


 


WBC  


 


RBC  


 


Hgb  


 


Hct  


 


MCV  


 


MCH  


 


MCHC  


 


RDW  


 


Plt Count  


 


MPV  


 


Neut % (Auto)  


 


Lymph % (Auto)  


 


Mono % (Auto)  


 


Eos % (Auto)  


 


Baso % (Auto)  


 


Lymph # (Auto)  


 


Mono # (Auto)  


 


Eos # (Auto)  


 


Baso # (Auto)  


 


Absolute Neuts (auto)  


 


Sodium  


 


Potassium  


 


Chloride  


 


Carbon Dioxide  


 


Anion Gap  


 


BUN  


 


Creatinine  


 


Est GFR ( Amer)  


 


Est GFR (Non-Af Amer)  


 


Random Glucose  


 


Calcium  


 


Total Bilirubin  


 


AST  


 


ALT  


 


Alkaline Phosphatase  


 


Troponin I   < 0.01


 


Total Protein  


 


Albumin  


 


Globulin  


 


Albumin/Globulin Ratio  


 


Alpha Fetoprotein  


 


Hepatitis A IgM Ab  Negative 


 


Hep Bs Antigen  Negative 


 


Hep B Core IgM Ab  Negative 


 


Hepatitis C Antibody  Negative 














Attending/Attestation





- Attestation


I have personally seen and examined this patient.: Yes


I have fully participated in the care of the patient.: Yes


I have reviewed all pertinent clinical information: Yes


Notes (Text): 


This is an addendum to the GI progress report dictated by  GI  fellow.  Patient 

was seen and evaluated here earlier along with the fellow.


CT scan was reviewed with the resident amount of stool present.


Patient comfortable at the time of examination we will slowly increase the diet


04/09/19 23:50

## 2019-04-09 NOTE — CARD
--------------- APPROVED REPORT --------------





Date of service: 04/09/2019



EKG Measurement

Heart [id number]

FL [id number]

[id number]

[id number]

[id number]



<Conclusion>

Normal sinus rhythm

Normal ECG

## 2019-04-09 NOTE — CARD
--------------- APPROVED REPORT --------------





Date of service: 04/09/2019



EKG Measurement

Heart [id number]

[id number]

[id number]

[id number]

[id number]



<Conclusion>

Normal sinus rhythm

Normal ECG

## 2019-04-09 NOTE — CP.PCM.PN
<Jose Carlos Barron - Last Filed: 04/09/19 13:04>





Subjective





- Date & Time of Evaluation


Date of Evaluation: 04/09/19


Time of Evaluation: 08:30





- Subjective


Subjective: 


Jose Carlos Barron PGY-1 Progress Note for Hospitalist Service





Patient seen and evaluated at bedside. Patient increasingly retched throughout 

the night without vomiting or hematemesis. Patient was given Morphine 2 mg and 

Reglan 10 mg IVP as well as protonix 40 mg IVP and benadryl 25 mg PO. Remains 

NPO at this time.








Objective





- Vital Signs/Intake and Output


Vital Signs (last 24 hours): 


                                        











Temp Pulse Resp BP Pulse Ox


 


 98.4 F   65   18   166/86 H  93 L


 


 04/09/19 08:18  04/09/19 08:18  04/09/19 08:18  04/09/19 08:18  04/09/19 08:18








Intake and Output: 


                                        











 04/09/19 04/09/19





 06:59 18:59


 


Intake Total 0 


 


Output Total 600 


 


Balance -600 














- Medications


Medications: 


                               Current Medications





Famotidine (Pepcid)  40 mg PO HS KATIE


Sodium Chloride (Sodium Chloride 0.9%)  1,000 mls @ 100 mls/hr IV .Q10H [facility]


   Last Admin: 04/09/19 05:17 Dose:  100 mls/hr


Ondansetron HCl (Zofran Inj)  4 mg IVP Q4H PRN


   PRN Reason: Nausea/Vomiting


   Last Admin: 04/09/19 01:20 Dose:  4 mg


Pantoprazole Sodium (Protonix Inj)  40 mg IVP 0700 [facility]


Vitamin A (Vitamin A & D Oint Ud Foilpak)  1 ea TOP Q2 PRN


   PRN Reason: Dry mouth











- Labs


Labs: 


                                        





                                 04/09/19 05:30 





                                 04/09/19 05:30 





                                        











PT  12.0 SECONDS (9.4-12.5)   04/08/19  11:58    


 


INR  1.06   04/08/19  11:58    


 


APTT  34.9 Seconds (26.9-38.3)   04/08/19  11:58    














- Additional Findings


Additional findings: 





- Constitutional


Appears: Non-toxic, No Acute Distress





- Head Exam


Head Exam: ATRAUMATIC, NORMOCEPHALIC





- Eye Exam


Eye Exam: EOMI, PERRL.  absent: Scleral icterus


Pupil Exam: PERRL.  absent: Miosis, Mydriatic





- ENT Exam


ENT Exam: Mucous Membranes Moist, Normal Oropharynx





- Neck Exam


Neck exam: Positive for: Full Rom, Normal Inspection





- Respiratory Exam


Respiratory Exam: Clear to Auscultation Bilateral.  absent: Rales, Rhonchi, 

Wheezes





- Cardiovascular Exam


Cardiovascular Exam: RRR, +S1, +S2.  absent: Gallop, Rubs





- GI/Abdominal Exam


GI & Abdominal Exam: Normal Bowel Sounds, Soft.  absent: Distended, Firm, 

Guarding, Organomegaly, Rebound, Rigid, Tenderness





- Extremities Exam


Extremities exam: Positive for: normal inspection.  Negative for: pedal edema





- Neurological Exam


Neurological exam: Alert





- Psychiatric Exam


Psychiatric exam: Normal Affect, Normal Mood





- Skin


Skin Exam: Dry, Intact, Normal Color, Warm





Assessment and Plan





- Assessment and Plan (Free Text)


Assessment: 





43 year old male with a past medical history significant for recent appendectomy

presents for N/V, diarrhea and epigastric abdominal pain for the past three 

days. Patient admits to ecstasy and ~30 bags of heroin daily, last on 

Thursday/Friday.





Plan: 





Abdominal Pain


-CT Abdomen/Pelvis showed no acute processes


-Afebrile, non-tachycardic and with noted mild leukocytosis


-Normal Saline at 100mls/hr


-Zofran 4mg Q4 PRN for N/V


-Pepcid 40 mg PO, Protonix 40mg IVP


-Vitamin A&D ointment PRN for dry mouth


-GI consult - Dr. Fletcher - recs appreciated


-CLD per GI recs, monitor tolerance


-F/u Hep panel, AFP, fecal leukocytes and stool cx





Illicit drug abuse


UDS + for opiates


currently likely in opioid withdrawal with rhinorrhea, lacrimation and abdominal

pains


Counseled on cessation





Leukocytosis


-Worsening, likely reactive


-Chest X-Ray showed no active disease


-UA showing no signs of UTI


-Continue to monitor with daily CBC's





Chest Pain r/o ACS


-Likely related to epigastric pain


-Troponin neg x2


-EKG ordered to r/o cardiac etiology


-Fam hx of MI in uncle at age 38





Liver Cyst


-Noted incidentally on CT abdomen/pelvis


-Will obtain records from [facility] for comparison


-Will recommend outpatient followup for serial imaging





GI Prophylaxis: Protonix


DVT Prophylaxis: SCD's





Patient seen, case reviewed and plan approved by Dr. Thornton.





Jose Carlos Barron, PGY-1








<Clara Thornton - Last Filed: 04/09/19 13:58>





Objective





- Vital Signs/Intake and Output


Vital Signs (last 24 hours): 


                                        











Temp Pulse Resp BP Pulse Ox


 


 98.4 F   65   18   166/86 H  93 L


 


 04/09/19 08:18  04/09/19 08:18  04/09/19 08:18  04/09/19 08:18  04/09/19 08:18








Intake and Output: 


                                        











 04/09/19 04/09/19





 06:59 18:59


 


Intake Total 0 


 


Output Total 600 


 


Balance -600 














- Medications


Medications: 


                               Current Medications





Famotidine (Pepcid)  40 mg PO HS KATIE


Sodium Chloride (Sodium Chloride 0.9%)  1,000 mls @ 100 mls/hr IV .Q10H KATIE


   Last Admin: 04/09/19 05:17 Dose:  100 mls/hr


Ondansetron HCl (Zofran Inj)  4 mg IVP Q4H PRN


   PRN Reason: Nausea/Vomiting


   Last Admin: 04/09/19 01:20 Dose:  4 mg


Pantoprazole Sodium (Protonix Inj)  40 mg IVP 0700 [facility]


Polyethylene Glycol (Miralax)  17 gm PO DAILY [facility]


Vitamin A (Vitamin A & D Oint Ud Foilpak)  1 ea TOP Q2 PRN


   PRN Reason: Dry mouth











- Labs


Labs: 


                                        





                                 04/09/19 05:30 





                                 04/09/19 05:30 





                                        











PT  12.0 SECONDS (9.4-12.5)   04/08/19  11:58    


 


INR  1.06   04/08/19  11:58    


 


APTT  34.9 Seconds (26.9-38.3)   04/08/19  11:58    














Attending/Attestation





- Attestation


I have personally seen and examined this patient.: Yes


I have fully participated in the care of the patient.: Yes


I have reviewed all pertinent clinical information, including history, physical 

exam and plan: Yes


Notes (Text): 





04/09/19 13:54


43 year old male who presented with complaint of intractable nausea and vomiting

with episodes of diarrhea and epigastric pain.  CT abd/pelvis showed incidental 

1.3 cm right lobe liver lesion, otherwise unremarkable.  Leukocytosis on labs, 

likely reactive.  CXR/UA is negative.  UTox was positive for opiates.  Initially

he denied recent drug use but now admits to cocaine, ectasy and alcohol use.  He

was counselled on risks of continued substance abuse.


Overnight he complained of nausea, wretching and abdominal discomfort.  GI 

evaluation was requested who added pepcid to protonix.  Diet advanced to liquids

this morning.  Will monitor on IVF and antiemetics.  Will need outpatient follow

up for incidental liver lesion seen on CT scan.  AFP and hepatitis panel was 

ordered.  D/c planning once patient is tolerating diet.





Clara Thornton MD


Hospitalist.

## 2019-04-09 NOTE — CP.PCM.PCO
<Bradley Son - Last Filed: 04/09/19 04:46>





Physician Communication Note





- Physician Communication Note


Physician Communication Note: see above





<Aaron Laird - Last Filed: 04/09/19 05:23>





Attending/Attestation





- Attestation


I have personally seen and examined this patient.: Yes


I have fully participated in the care of the patient.: Yes


I have reviewed all pertinent clinical information: Yes


Notes (Text): 





04/09/19 04:59


Denies headache, dizziness, paraesthesia.3Gives history of chest pain for past 3

weeks on and off, some times feels that somebody is sitting on chest , feels 

heviness in the chest, gives history his uncle having mi at age 38 years, does 

30 bags of heroine a day, has symptoms of nausea, vomiting , abdominal pain 

(epigastric) of 3-4 days duration.


OE : there is no nuchal rigidiy, no epigastric or abdominal or chest wall 

tenderness.


Intractable nausea/vomiting/retching may be due to analgesic like morphine, from

having heroine withdrawal.


If continues, will get GI consultation for evaluation of possible right lower 

lobe of liver 1.3 cm mass as suggested by CT abdomen/pelvis.

## 2019-04-10 LAB
ALBUMIN SERPL-MCNC: 3.5 G/DL (ref 3–4.8)
ALBUMIN/GLOB SERPL: 1.2 {RATIO} (ref 1.1–1.8)
ALT SERPL-CCNC: 13 U/L (ref 7–56)
AST SERPL-CCNC: 27 U/L (ref 17–59)
BASOPHILS # BLD AUTO: 0.01 K/MM3 (ref 0–2)
BASOPHILS NFR BLD: 0.1 % (ref 0–3)
BUN SERPL-MCNC: 21 MG/DL (ref 7–21)
CALCIUM SERPL-MCNC: 8.6 MG/DL (ref 8.4–10.5)
EOSINOPHIL # BLD: 0 10*3/UL (ref 0–0.7)
EOSINOPHIL NFR BLD: 0.1 % (ref 1.5–5)
ERYTHROCYTE [DISTWIDTH] IN BLOOD BY AUTOMATED COUNT: 12.9 % (ref 11.5–14.5)
GFR NON-AFRICAN AMERICAN: > 60
HGB BLD-MCNC: 13.4 G/DL (ref 14–18)
LYMPHOCYTES # BLD: 2.1 10*3/UL (ref 1.2–3.4)
LYMPHOCYTES NFR BLD AUTO: 16.9 % (ref 22–35)
MCH RBC QN AUTO: 29.9 PG (ref 25–35)
MCHC RBC AUTO-ENTMCNC: 33.7 G/DL (ref 31–37)
MCV RBC AUTO: 88.8 FL (ref 80–105)
MONOCYTES # BLD AUTO: 0.7 10*3/UL (ref 0.1–0.6)
MONOCYTES NFR BLD: 5.4 % (ref 1–6)
PLATELET # BLD: 218 10^3/UL (ref 120–450)
PMV BLD AUTO: 10.2 FL (ref 7–11)
RBC # BLD AUTO: 4.48 10^6/UL (ref 3.5–6.1)
WBC # BLD AUTO: 12.7 10^3/UL (ref 4.5–11)

## 2019-04-10 RX ADMIN — POLYETHYLENE GLYCOL 3350 SCH: 17 POWDER, FOR SOLUTION ORAL at 09:00

## 2019-04-10 NOTE — CP.PCM.DIS
<Jose Carlos Barron - Last Filed: 04/10/19 13:27>





Provider





- Provider


Date of Admission: 


04/08/19 16:02





Attending physician: 


Clara Thornton MD





Primary care physician: 


Dr. Shrestha


Consults: 








04/09/19 06:52


Gastroenterology Consult Routine 


   Comment: 


   Consulting Provider: Chris Fletcher


   Consulting Physician: Chris Fletcher


   Reason for Consult: intractable n/v, abd pain











Time Spent in preparation of Discharge (in minutes): 40





Hospital Course





- Lab Results


Lab Results: 


                                  Micro Results





04/08/19 17:30   Urine,Clean Catch   Urine Culture - Final


                            No Growth (<1,000 CFU/ML)





                             Most Recent Lab Values











WBC  12.7 10^3/uL (4.5-11.0)  H  04/10/19  06:25    


 


RBC  4.48 10^6/uL (3.5-6.1)   04/10/19  06:25    


 


Hgb  13.4 g/dL (14.0-18.0)  L  04/10/19  06:25    


 


Hct  39.8 % (42.0-52.0)  L  04/10/19  06:25    


 


MCV  88.8 fl (80.0-105.0)   04/10/19  06:25    


 


MCH  29.9 pg (25.0-35.0)   04/10/19  06:25    


 


MCHC  33.7 g/dl (31.0-37.0)   04/10/19  06:25    


 


RDW  12.9 % (11.5-14.5)   04/10/19  06:25    


 


Plt Count  218 10^3/uL (120.0-450.0)   04/10/19  06:25    


 


MPV  10.2 fl (7.0-11.0)   04/10/19  06:25    


 


Neut % (Auto)  77.5 % (50.0-68.0)  H  04/10/19  06:25    


 


Lymph % (Auto)  16.9 % (22.0-35.0)  L  04/10/19  06:25    


 


Mono % (Auto)  5.4 % (1.0-6.0)   04/10/19  06:25    


 


Eos % (Auto)  0.1 % (1.5-5.0)  L  04/10/19  06:25    


 


Baso % (Auto)  0.1 % (0.0-3.0)   04/10/19  06:25    


 


Lymph # (Auto)  2.1  (1.2-3.4)   04/10/19  06:25    


 


Mono # (Auto)  0.7  (0.1-0.6)  H  04/10/19  06:25    


 


Eos # (Auto)  0.0  (0.0-0.7)   04/10/19  06:25    


 


Baso # (Auto)  0.01 K/mm3 (0.0-2.0)   04/10/19  06:25    


 


Absolute Neuts (auto)  9.83  (1.4-6.5)  H  04/10/19  06:25    


 


PT  12.0 SECONDS (9.4-12.5)   04/08/19  11:58    


 


INR  1.06   04/08/19  11:58    


 


APTT  34.9 Seconds (26.9-38.3)   04/08/19  11:58    


 


Sodium  141 mmol/L (132-148)   04/10/19  06:25    


 


Potassium  3.9 mmol/L (3.6-5.0)   04/10/19  06:25    


 


Chloride  110 mmol/L ()  H  04/10/19  06:25    


 


Carbon Dioxide  26 mmol/L (21-33)   04/10/19  06:25    


 


Anion Gap  10  (10-20)   04/10/19  06:25    


 


BUN  21 mg/dL (7-21)   04/10/19  06:25    


 


Creatinine  0.9 mg/dl (0.8-1.5)   04/10/19  06:25    


 


Est GFR ( Amer)  > 60   04/10/19  06:25    


 


Est GFR (Non-Af Amer)  > 60   04/10/19  06:25    


 


Random Glucose  100 mg/dL ()   04/10/19  06:25    


 


Calcium  8.6 mg/dL (8.4-10.5)   04/10/19  06:25    


 


Total Bilirubin  0.6 mg/dL (0.2-1.3)   04/10/19  06:25    


 


AST  27 U/L (17-59)   04/10/19  06:25    


 


ALT  13 U/L (7-56)   04/10/19  06:25    


 


Alkaline Phosphatase  48 U/L ()   04/10/19  06:25    


 


Troponin I  < 0.01 ng/mL  04/09/19  13:15    


 


Total Protein  6.4 g/dL (5.8-8.3)   04/10/19  06:25    


 


Albumin  3.5 g/dL (3.0-4.8)   04/10/19  06:25    


 


Globulin  2.8 gm/dL  04/10/19  06:25    


 


Albumin/Globulin Ratio  1.2  (1.1-1.8)   04/10/19  06:25    


 


Lipase  83 U/L ()   04/08/19  11:58    


 


Alpha Fetoprotein  1.2 ng/mL (0.0-7.5)   04/09/19  08:50    


 


Urine Color  Yellow  (YELLOW)   04/08/19  17:30    


 


Urine Appearance  Clear  (CLEAR)   04/08/19  17:30    


 


Urine pH  8.5  (4.7-8.0)   04/08/19  17:30    


 


Ur Specific Gravity  <= 1.005  (1.005-1.035)   04/08/19  17:30    


 


Urine Protein  30 mg/dL (<30 mg/dL)  H  04/08/19  17:30    


 


Urine Glucose (UA)  Negative mg/dL (NEGATIVE)   04/08/19  17:30    


 


Urine Ketones  40 mg/dL (NEGATIVE)  H  04/08/19  17:30    


 


Urine Blood  Negative  (NEGATIVE)   04/08/19  17:30    


 


Urine Nitrate  Negative  (NEGATIVE)   04/08/19  17:30    


 


Urine Bilirubin  Negative  (NEGATIVE)   04/08/19  17:30    


 


Urine Urobilinogen  1.0 E.U./dL (<1 E.U./dL)  H  04/08/19  17:30    


 


Ur Leukocyte Esterase  Negative Jere/uL (NEGATIVE)   04/08/19  17:30    


 


Urine RBC  TEST NOT PERFORMED   04/08/19  17:30    


 


Urine WBC  5 - 10 /hpf (0-6)  H  04/08/19  17:30    


 


Ur Epithelial Cells  6 - 8 /hpf (0-5)  H  04/08/19  17:30    


 


Amorphous Sediment  Trace /hpf (NONE)   04/08/19  17:30    


 


Urine Opiates Screen  Positive  (NEGATIVE)  H  04/08/19  17:30    


 


Urine Methadone Screen  Negative  (NEGATIVE)   04/08/19  17:30    


 


Ur Barbiturates Screen  Negative  (NEGATIVE)   04/08/19  17:30    


 


Ur Phencyclidine Scrn  Negative  (NEGATIVE)   04/08/19  17:30    


 


Ur Amphetamines Screen  Negative  (NEGATIVE)   04/08/19  17:30    


 


U Benzodiazepines Scrn  Negative  (NEGATIVE)   04/08/19  17:30    


 


U Oth Cocaine Metabols  Negative  (NEGATIVE)   04/08/19  17:30    


 


U Cannabinoids Screen  Negative  (NEGATIVE)   04/08/19  17:30    


 


Hepatitis A IgM Ab  Negative  (NEGATIVE)   04/09/19  08:50    


 


Hep Bs Antigen  Negative  (NEGATIVE)   04/09/19  08:50    


 


Hep B Core IgM Ab  Negative  (NEGATIVE)   04/09/19  08:50    


 


Hepatitis C Antibody  Negative  (NEGATIVE)   04/09/19  08:50    














- Hospital Course


Hospital Course: 


Jose Carlos Barron, PGY-1 Discharge Summary for Hospitalist Service





43 year old male who presented with complaint of intractable nausea and vomiting

with episodes of diarrhea and epigastric pain. CT abd/pelvis was ordered and 

showed incidental 1.3 cm right lobe liver lesion, but was otherwise 

unremarkable. Leukocytosis on labs was likely reactive to vomiting.  CXR and UA 

were also negative.  UTox was positive for opiates. Initially, patient denied 

recent drug use but later in course admits to cocaine, ecstasy and alcohol use. 

He was counselled at length on risks of continued substance abuse as etiology of

abdominal pain.


Patient complained of nausea, wretching and abdominal discomfort.  GI evaluation

was requested who added pepcid to protonix.  Diet was slowly advanced to 

liquids. Patient was initially monitored on IVF and antiemetics.  Patient will 

need outpatient follow up for incidental liver lesion seen on CT scan as 

outpatient.  AFP and hepatitis panel were ordered and were negative. Patient was

tolerating diet. 


Patient was counselled on alcohol and illicit drug cessation. An appointment was

made in [facility] for followup and further GI referral for incidental GI finding.





Hospital course was reviewed with patient and all questions were answered at 

length and to patient satisfaction.


Patient understood need for follow up in clinic.


Patient was discharged in no acute distress and with patient agreement.





Patient seen, case reviewed and plan approved by Dr. Thornton.











Discharge Exam





- Additional Findings


Additional findings: 





- Constitutional


Appears: Non-toxic, No Acute Distress





- Head Exam


Head Exam: ATRAUMATIC, NORMOCEPHALIC





- Eye Exam


Eye Exam: EOMI, PERRL.  absent: Scleral icterus


Pupil Exam: PERRL.  absent: Miosis, Mydriatic





- ENT Exam


ENT Exam: Mucous Membranes Moist, Normal Oropharynx





- Neck Exam


Neck exam: Positive for: Full Rom, Normal Inspection





- Respiratory Exam


Respiratory Exam: Clear to Auscultation Bilateral.  absent: Rales, Rhonchi, 

Wheezes





- Cardiovascular Exam


Cardiovascular Exam: RRR, +S1, +S2.  absent: Gallop, Rubs





- GI/Abdominal Exam


GI & Abdominal Exam: Normal Bowel Sounds, Soft.  absent: Distended, Firm, 

Guarding, Organomegaly, Rebound, Rigid, Tenderness





- Extremities Exam


Extremities exam: Positive for: normal inspection.  Negative for: pedal edema





- Neurological Exam


Neurological exam: Alert





- Psychiatric Exam


Psychiatric exam: Normal Affect, Normal Mood





- Skin


Skin Exam: Dry, Intact, Normal Color, Warm





Discharge Plan





- Discharge Medications


Prescriptions: 


Pantoprazole [Protonix] 40 mg PO DAILY #14 ect





- Follow Up Plan


Condition: GOOD


Disposition: HOME/ ROUTINE


Instructions:  Drug Abuse and Drug Addiction (DC), Narcotic Overdose  (DC), 

Polysubstance Abuse (DC)


Additional Instructions: 


Please follow up in [facility] on Thursday 4/18 at 2 pm. Please 

have them refer you to a gastroenterology clinic to follow up on your liver 

lesion. Please bring your insurance card and ID.


You have been provided your medication of Protonix at bedside. Please take one 

daily as prescribed.


Please avoid all alcohol and illicit drug use, including heroin and ecstasy. 

Your condition may worsen if you do use these drugs again.


Should symptoms worsen, please visit nearest emergency department.





Referrals: 


[facility] [place]





<Clara Thornton - Last Filed: 04/10/19 14:39>





Provider





- Provider


Date of Admission: 


04/08/19 16:02





Attending physician: 


Clara Thornton MD





Consults: 








04/09/19 06:52


Gastroenterology Consult Routine 


   Comment: 


   Consulting Provider: Chris Fletcher


   Consulting Physician: Chris Fletcher


   Reason for Consult: intractable n/v, abd pain














Hospital Course





- Lab Results


Lab Results: 


                                  Micro Results





04/08/19 17:30   Urine,Clean Catch   Urine Culture - Final


                            No Growth (<1,000 CFU/ML)





                             Most Recent Lab Values











WBC  12.7 10^3/uL (4.5-11.0)  H  04/10/19  06:25    


 


RBC  4.48 10^6/uL (3.5-6.1)   04/10/19  06:25    


 


Hgb  13.4 g/dL (14.0-18.0)  L  04/10/19  06:25    


 


Hct  39.8 % (42.0-52.0)  L  04/10/19  06:25    


 


MCV  88.8 fl (80.0-105.0)   04/10/19  06:25    


 


MCH  29.9 pg (25.0-35.0)   04/10/19  06:25    


 


MCHC  33.7 g/dl (31.0-37.0)   04/10/19  06:25    


 


RDW  12.9 % (11.5-14.5)   04/10/19  06:25    


 


Plt Count  218 10^3/uL (120.0-450.0)   04/10/19  06:25    


 


MPV  10.2 fl (7.0-11.0)   04/10/19  06:25    


 


Neut % (Auto)  77.5 % (50.0-68.0)  H  04/10/19  06:25    


 


Lymph % (Auto)  16.9 % (22.0-35.0)  L  04/10/19  06:25    


 


Mono % (Auto)  5.4 % (1.0-6.0)   04/10/19  06:25    


 


Eos % (Auto)  0.1 % (1.5-5.0)  L  04/10/19  06:25    


 


Baso % (Auto)  0.1 % (0.0-3.0)   04/10/19  06:25    


 


Lymph # (Auto)  2.1  (1.2-3.4)   04/10/19  06:25    


 


Mono # (Auto)  0.7  (0.1-0.6)  H  04/10/19  06:25    


 


Eos # (Auto)  0.0  (0.0-0.7)   04/10/19  06:25    


 


Baso # (Auto)  0.01 K/mm3 (0.0-2.0)   04/10/19  06:25    


 


Absolute Neuts (auto)  9.83  (1.4-6.5)  H  04/10/19  06:25    


 


PT  12.0 SECONDS (9.4-12.5)   04/08/19  11:58    


 


INR  1.06   04/08/19  11:58    


 


APTT  34.9 Seconds (26.9-38.3)   04/08/19  11:58    


 


Sodium  141 mmol/L (132-148)   04/10/19  06:25    


 


Potassium  3.9 mmol/L (3.6-5.0)   04/10/19  06:25    


 


Chloride  110 mmol/L ()  H  04/10/19  06:25    


 


Carbon Dioxide  26 mmol/L (21-33)   04/10/19  06:25    


 


Anion Gap  10  (10-20)   04/10/19  06:25    


 


BUN  21 mg/dL (7-21)   04/10/19  06:25    


 


Creatinine  0.9 mg/dl (0.8-1.5)   04/10/19  06:25    


 


Est GFR ( Amer)  > 60   04/10/19  06:25    


 


Est GFR (Non-Af Amer)  > 60   04/10/19  06:25    


 


Random Glucose  100 mg/dL ()   04/10/19  06:25    


 


Calcium  8.6 mg/dL (8.4-10.5)   04/10/19  06:25    


 


Total Bilirubin  0.6 mg/dL (0.2-1.3)   04/10/19  06:25    


 


AST  27 U/L (17-59)   04/10/19  06:25    


 


ALT  13 U/L (7-56)   04/10/19  06:25    


 


Alkaline Phosphatase  48 U/L ()   04/10/19  06:25    


 


Troponin I  < 0.01 ng/mL  04/09/19  13:15    


 


Total Protein  6.4 g/dL (5.8-8.3)   04/10/19  06:25    


 


Albumin  3.5 g/dL (3.0-4.8)   04/10/19  06:25    


 


Globulin  2.8 gm/dL  04/10/19  06:25    


 


Albumin/Globulin Ratio  1.2  (1.1-1.8)   04/10/19  06:25    


 


Lipase  83 U/L ()   04/08/19  11:58    


 


Alpha Fetoprotein  1.2 ng/mL (0.0-7.5)   04/09/19  08:50    


 


Urine Color  Yellow  (YELLOW)   04/08/19  17:30    


 


Urine Appearance  Clear  (CLEAR)   04/08/19  17:30    


 


Urine pH  8.5  (4.7-8.0)   04/08/19  17:30    


 


Ur Specific Gravity  <= 1.005  (1.005-1.035)   04/08/19  17:30    


 


Urine Protein  30 mg/dL (<30 mg/dL)  H  04/08/19  17:30    


 


Urine Glucose (UA)  Negative mg/dL (NEGATIVE)   04/08/19  17:30    


 


Urine Ketones  40 mg/dL (NEGATIVE)  H  04/08/19  17:30    


 


Urine Blood  Negative  (NEGATIVE)   04/08/19  17:30    


 


Urine Nitrate  Negative  (NEGATIVE)   04/08/19  17:30    


 


Urine Bilirubin  Negative  (NEGATIVE)   04/08/19  17:30    


 


Urine Urobilinogen  1.0 E.U./dL (<1 E.U./dL)  H  04/08/19  17:30    


 


Ur Leukocyte Esterase  Negative Jere/uL (NEGATIVE)   04/08/19  17:30    


 


Urine RBC  TEST NOT PERFORMED   04/08/19  17:30    


 


Urine WBC  5 - 10 /hpf (0-6)  H  04/08/19  17:30    


 


Ur Epithelial Cells  6 - 8 /hpf (0-5)  H  04/08/19  17:30    


 


Amorphous Sediment  Trace /hpf (NONE)   04/08/19  17:30    


 


Urine Opiates Screen  Positive  (NEGATIVE)  H  04/08/19  17:30    


 


Urine Methadone Screen  Negative  (NEGATIVE)   04/08/19  17:30    


 


Ur Barbiturates Screen  Negative  (NEGATIVE)   04/08/19  17:30    


 


Ur Phencyclidine Scrn  Negative  (NEGATIVE)   04/08/19  17:30    


 


Ur Amphetamines Screen  Negative  (NEGATIVE)   04/08/19  17:30    


 


U Benzodiazepines Scrn  Negative  (NEGATIVE)   04/08/19  17:30    


 


U Oth Cocaine Metabols  Negative  (NEGATIVE)   04/08/19  17:30    


 


U Cannabinoids Screen  Negative  (NEGATIVE)   04/08/19  17:30    


 


Hepatitis A IgM Ab  Negative  (NEGATIVE)   04/09/19  08:50    


 


Hep Bs Antigen  Negative  (NEGATIVE)   04/09/19  08:50    


 


Hep B Core IgM Ab  Negative  (NEGATIVE)   04/09/19  08:50    


 


Hepatitis C Antibody  Negative  (NEGATIVE)   04/09/19  08:50    














Attending/Attestation





- Attestation


I have personally seen and examined this patient.: Yes


I have fully participated in the care of the patient.: Yes


I have reviewed all pertinent clinical information, including history, physical 

exam and plan: Yes


Notes (Text): 





04/10/19 14:34


43 year old male who presented with complaint of intractable nausea and vomiting

 with episodes of diarrhea and epigastric pain secondary to acute 

gastroenteritis vs substance abuse withdrawal.  UTox was positive for opiates 

and patient admitted to use of cocaine, ectasy and ETOH.  He was counselled on 

risks of continued substance and alcohol abuse.  He was initially NPO with iv 

fluids, PPI and antiemetics.  His symptoms improved and his diet was advanced 

which he tolerated today.  Labs showed mild leukocytosis, likely reactive.  CT 

scan showed incidental 1.3 cm right lobe liver lesion for which he was 

recommended to follow up closely as outpatient.





Patient is discharged home to follow up with pmd.


Follow up with GI; follow up on liver lesion.


Counselled on alcohol cessation.


Counselled on risks of continued substance abuse.





Clara Thornton MD


Hospitalist.

## 2019-05-22 ENCOUNTER — HOSPITAL ENCOUNTER (EMERGENCY)
Dept: HOSPITAL 31 - C.ER | Age: 44
End: 2019-05-22
Payer: MEDICAID